# Patient Record
Sex: MALE | Race: WHITE | HISPANIC OR LATINO | Employment: FULL TIME | URBAN - METROPOLITAN AREA
[De-identification: names, ages, dates, MRNs, and addresses within clinical notes are randomized per-mention and may not be internally consistent; named-entity substitution may affect disease eponyms.]

---

## 2020-10-21 ENCOUNTER — HOSPITAL ENCOUNTER (EMERGENCY)
Facility: MEDICAL CENTER | Age: 60
End: 2020-10-21
Attending: EMERGENCY MEDICINE
Payer: COMMERCIAL

## 2020-10-21 VITALS
RESPIRATION RATE: 18 BRPM | HEART RATE: 54 BPM | BODY MASS INDEX: 33.98 KG/M2 | OXYGEN SATURATION: 94 % | DIASTOLIC BLOOD PRESSURE: 98 MMHG | WEIGHT: 224.21 LBS | SYSTOLIC BLOOD PRESSURE: 165 MMHG | HEIGHT: 68 IN | TEMPERATURE: 97.7 F

## 2020-10-21 DIAGNOSIS — S61.213A LACERATION OF LEFT MIDDLE FINGER WITHOUT FOREIGN BODY WITHOUT DAMAGE TO NAIL, INITIAL ENCOUNTER: ICD-10-CM

## 2020-10-21 PROCEDURE — 304999 HCHG REPAIR-SIMPLE/INTERMED LEVEL 1

## 2020-10-21 PROCEDURE — 99282 EMERGENCY DEPT VISIT SF MDM: CPT

## 2020-10-21 PROCEDURE — 304217 HCHG IRRIGATION SYSTEM

## 2020-10-21 PROCEDURE — 700101 HCHG RX REV CODE 250: Performed by: EMERGENCY MEDICINE

## 2020-10-21 PROCEDURE — 303747 HCHG EXTRA SUTURE

## 2020-10-21 PROCEDURE — A6403 STERILE GAUZE>16 <= 48 SQ IN: HCPCS

## 2020-10-21 RX ORDER — CEPHALEXIN 500 MG/1
500 CAPSULE ORAL 4 TIMES DAILY
Qty: 20 CAP | Refills: 0 | Status: SHIPPED | OUTPATIENT
Start: 2020-10-21 | End: 2020-10-26

## 2020-10-21 RX ORDER — LIDOCAINE HYDROCHLORIDE 10 MG/ML
20 INJECTION, SOLUTION INFILTRATION; PERINEURAL ONCE
Status: COMPLETED | OUTPATIENT
Start: 2020-10-21 | End: 2020-10-21

## 2020-10-21 RX ADMIN — LIDOCAINE HYDROCHLORIDE 20 ML: 10 INJECTION, SOLUTION INFILTRATION; PERINEURAL at 12:15

## 2020-10-21 SDOH — HEALTH STABILITY: MENTAL HEALTH: HOW OFTEN DO YOU HAVE A DRINK CONTAINING ALCOHOL?: 2-4 TIMES A MONTH

## 2020-10-21 NOTE — ED NOTES
ERP at bedside. Pt agrees with plan of care discussed by ERP. AIDET acknowledged with patient. Mayo in low position, side rail up for pt safety. Call light within reach. Will continue to monitor.

## 2020-10-21 NOTE — LETTER
"  FORM C-4:  EMPLOYEE’S CLAIM FOR COMPENSATION/ REPORT OF INITIAL TREATMENT  EMPLOYEE’S CLAIM - PROVIDE ALL INFORMATION REQUESTED   First Name Froylan Last Name Napoleon Birthdate 1960  Sex male Claim Number   Home Address [address]             [place] [address]                                   Age  60 y.o. Height  1.727 m (5' 8\") Weight  101.7 kg (224 lb 3.3 oz) [facility]  [id number]   Mailing Address [address]              Zip [address] Telephone  [phone] (home)  Primary Language Spoken   Insurer   Third Party   [employer] Employee's Occupation (Job Title) When Injury or Occupational Disease Occurred     Employer's Name  [employer] Telephone [phone]    Employer Address [address]] Zip [address]   Date of Injury  10/21/2020       Hour of Injury  10:30 AM Date Employer Notified  10/21/2020 Last Day of Work after Injury or Occupational Disease  10/21/2020 Supervisor to Whom Injury Reported  ZAC JEFF   Address or Location of Accident (if applicable) [[address]]   What were you doing at the time of accident? (if applicable) CUTTING A PIPE WITH CUTTER    How did this injury or occupational disease occur? Be specific and answer in detail. Use additional sheet if necessary)  THE BLADE ON THE CUTTER BROKE   If you believe that you have an occupational disease, when did you first have knowledge of the disability and it relationship to your employment? N/A Witnesses to the Accident  BRANDON BELEN   Nature of Injury or Occupational Disease  Workers' Compensation Part(s) of Body Injured or Affected  Hand (L), N/A, N/A    I CERTIFY THAT THE ABOVE IS TRUE AND CORRECT TO THE BEST OF MY KNOWLEDGE AND THAT I HAVE PROVIDED THIS INFORMATION IN ORDER TO OBTAIN THE BENEFITS OF [place]  I HEREBY " AUTHORIZE ANY PHYSICIAN, CHIROPRACTOR, SURGEON, PRACTITIONER, OR OTHER PERSON, ANY HOSPITAL, INCLUDING [facility] OR [facility], ANY MEDICAL SERVICE ORGANIZATION, ANY INSURANCE COMPANY, OR OTHER INSTITUTION OR ORGANIZATION TO RELEASE TO EACH OTHER, ANY MEDICAL OR OTHER INFORMATION, INCLUDING BENEFITS PAID OR PAYABLE, PERTINENT TO THIS INJURY OR DISEASE, EXCEPT INFORMATION RELATIVE TO DIAGNOSIS, TREATMENT AND/OR COUNSELING FOR AIDS, PSYCHOLOGICAL CONDITIONS, ALCOHOL OR CONTROLLED SUBSTANCES, FOR WHICH I MUST GIVE SPECIFIC AUTHORIZATION.  A PHOTOSTAT OF THIS AUTHORIZATION SHALL BE AS VALID AS THE ORIGINAL.  Date 10/21/2020                                      Place          [facility]                          Employee’s Signature   THIS REPORT MUST BE COMPLETED AND MAILED WITHIN 3 WORKING DAYS OF TREATMENT   Place [facility], EMERGENCY DEPT                       Name of Facility [facility]   Date  10/21/2020 Diagnosis  (S61.213A) Laceration of left middle finger without foreign body without damage to nail, initial encounter Is there evidence the injured employee was under the influence of alcohol and/or another controlled substance at the time of accident?   Hour  1:23 PM Description of Injury or Disease  Laceration of left middle finger without foreign body without damage to nail, initial encounter No   Treatment  Suturing, prophylactic antibiotics  Have you advised the patient to remain off work five days or more?         No   X-Ray Findings    If Yes   From Date    To Date      From information given by the employee, together with medical evidence, can you directly connect this injury or occupational disease as job incurred? Yes If No, is employee capable of: Full Duty  No Modified Duty  Yes   Is additional medical care by a physician indicated? Yes  Comments:Suture removal, reevaluation for resumption of normal activity If Modified Duty, Specify any  "Limitations / Restrictions   Avoid getting dirty or use of left third finger until cleared to resume normal activity at Worker's Comp. clinic.   Do you know of any previous injury or disease contributing to this condition or occupational disease? No    Date 10/21/2020 Print Doctor’s Name Arthur Hutson certify the employer’s copy of this form was mailed on:   Address [address] [place] [address] [phone] INSURER’S USE ONLY   Provider’s Tax ID Number [id number] Telephone Dept: [phone]    Doctor’s Signature e-ARTHUR Gil M.D. Degree  MD      Form C-4 (rev.10/07)                                                                         BRIEF DESCRIPTION OF RIGHTS AND BENEFITS  (Pursuant to [place] 616[place].050)    Notice of Injury or Occupational Disease (Incident Report Form C-1): If an injury or occupational disease (OD) arises out of and in the course of employment, you must provide written notice to your employer as soon as practicable, but no later than 7 days after the accident or OD. Your employer shall maintain a sufficient supply of the required forms.    Claim for Compensation (Form C-4): If medical treatment is sought, the form C-4 is available at the place of initial treatment. A completed \"Claim for Compensation\" (Form C-4) must be filed within 90 days after an accident or OD. The treating physician or chiropractor must, within 3 working days after treatment, complete and mail to the employer, the employer's insurer and third-party , the Claim for Compensation.    Medical Treatment: If you require medical treatment for your on-the-job injury or OD, you may be required to select a physician or chiropractor from a list provided by your workers’ compensation insurer, if it has contracted with an Organization for Managed Care (MCO) or Preferred Provider Organization (PPO) or providers of health care. If your employer has not entered into a contract with an MCO or PPO, " you may select a physician or chiropractor from the Panel of Physicians and Chiropractors. Any medical costs related to your industrial injury or OD will be paid by your insurer.    Temporary Total Disability (TTD): If your doctor has certified that you are unable to work for a period of at least 5 consecutive days, or 5 cumulative days in a 20-day period, or places restrictions on you that your employer does not accommodate, you may be entitled to TTD compensation.    Temporary Partial Disability (TPD): If the wage you receive upon reemployment is less than the compensation for TTD to which you are entitled, the insurer may be required to pay you TPD compensation to make up the difference. TPD can only be paid for a maximum of 24 months.    Permanent Partial Disability (PPD): When your medical condition is stable and there is an indication of a PPD as a result of your injury or OD, within 30 days, your insurer must arrange for an evaluation by a rating physician or chiropractor to determine the degree of your PPD. The amount of your PPD award depends on the date of injury, the results of the PPD evaluation and your age and wage.    Permanent Total Disability (PTD): If you are medically certified by a treating physician or chiropractor as permanently and totally disabled and have been granted a PTD status by your insurer, you are entitled to receive monthly benefits not to exceed 66 2/3% of your average monthly wage. The amount of your PTD payments is subject to reduction if you previously received a PPD award.    Vocational Rehabilitation Services: You may be eligible for vocational rehabilitation services if you are unable to return to the job due to a permanent physical impairment or permanent restrictions as a result of your injury or occupational disease.    Transportation and Per Damian Reimbursement: You may be eligible for travel expenses and per damina associated with medical treatment.    Reopening: You may be  able to reopen your claim if your condition worsens after claim closure.     Appeal Process: If you disagree with a written determination issued by the insurer or the insurer does not respond to your request, you may appeal to the Department of Administration, , by following the instructions contained in your determination letter. You must appeal the determination within 70 days from the date of the determination letter at [address], [address], [place], [place] [address], or [address], [address], [place], [place] [address]. If you disagree with the  decision, you may appeal to the Department of Administration, . You must file your appeal within 30 days from the date of the  decision letter at [address], [address], [place], [place] [address], or [address], [address], [place], [place] [address]. If you disagree with a decision of an , you may file a petition for judicial review with the District Court. You must do so within 30 days of the Appeal Officer’s decision. You may be represented by an  at your own expense or you may contact the [facility] for possible representation.    [place]  for Injured Workers (NAIW): If you disagree with a  decision, you may request that NAIW represent you without charge at an  Hearing. For information regarding denial of benefits, you may contact the [facility] at: [address], [address], [place] [address], [phone], or [address], [place] [address], [phone]    To File a Complaint with the Division: If you wish to file a complaint with the  of the Division of Industrial Relations (DIR),  please contact the Workers’ Compensation Section, [address], [place], [place] [address], telephone [phone], or [address], [place], [place]  [address], telephone [phone].    For assistance with Workers’ Compensation Issues: You may contact the Office of the Governor Consumer Health Assistance, [address] [place] [address], [address], [place] [address], Toll Free 1-674.112.6080, Web site: [email or web address] [facility].[email or web address]., E-mail [email or web address].  D-2 (rev. 06/18)              __________________________________________________________________                                    _________________            Employee Name / Signature                                                                                                                            Date

## 2020-10-21 NOTE — ED PROVIDER NOTES
ED Provider Note    CHIEF COMPLAINT   Chief Complaint   Patient presents with   • Hand Laceration     Left hand cut with blade at work       HPI   Froylan Bender is a 60 y.o. male who presents laceration to left third finger, dorsum.  Occurred today at work with some type of disc cutter.  Last tetanus shot 2 years ago.  Pain is mild to moderate.  No numbness.  No loss of function of the left hand.  Patient denies other injuries.    REVIEW OF SYSTEMS   Musculoskeletal: Left hand pain  Neurologic: No numbness  Skin: Left third finger laceration      PAST MEDICAL HISTORY   History reviewed. No pertinent past medical history.    FAMILY HISTORY  History reviewed. No pertinent family history.    SOCIAL HISTORY  Social History     Socioeconomic History   • Marital status: Not on file     Spouse name: Not on file   • Number of children: Not on file   • Years of education: Not on file   • Highest education level: Not on file   Occupational History   • Not on file   Social Needs   • Financial resource strain: Not on file   • Food insecurity     Worry: Not on file     Inability: Not on file   • Transportation needs     Medical: Not on file     Non-medical: Not on file   Tobacco Use   • Smoking status: Never Smoker   Substance and Sexual Activity   • Alcohol use: Yes     Frequency: 2-4 times a month   • Drug use: Never   • Sexual activity: Not on file   Lifestyle   • Physical activity     Days per week: Not on file     Minutes per session: Not on file   • Stress: Not on file   Relationships   • Social connections     Talks on phone: Not on file     Gets together: Not on file     Attends Jainism service: Not on file     Active member of club or organization: Not on file     Attends meetings of clubs or organizations: Not on file     Relationship status: Not on file   • Intimate partner violence     Fear of current or ex partner: Not on file     Emotionally abused: Not on file     Physically abused: Not on file     Forced  "sexual activity: Not on file   Other Topics Concern   • Not on file   Social History Narrative   • Not on file       SURGICAL HISTORY  History reviewed. No pertinent surgical history.    CURRENT MEDICATIONS   Home Medications    **Home medications have not yet been reviewed for this encounter**         ALLERGIES   No Known Allergies    PHYSICAL EXAM  VITAL SIGNS: BP (!) 174/99   Pulse (!) 53   Temp 36.5 °C (97.7 °F) (Temporal)   Resp 16   Ht 1.727 m (5' 8\")   Wt 101.7 kg (224 lb 3.3 oz)   SpO2 95%   BMI 34.09 kg/m²   Skin: Flap-like laceration dorsum of the left third finger over the proximal phalanx, examination wound reveals no evidence of foreign body lacerated tendon..   Vascular: Intact distal capillary refill.   Musculoskeletal: Patient has full flexion-extension all fingers.  Neurologic: Patient is intact to all fingertips left hand    RADIOLOGY/PROCEDURES  Laceration Repair Procedure Note    Indication: Laceration    Procedure: The patient was placed in the appropriate position and anesthesia around the laceration was obtained by infiltration using 1% Lidocaine without epinephrine. The area was then cleansed with betadine and draped in a sterile fashion and irrigated with high pressure normal saline. The laceration was closed with 4-0 Ethilon using interrupted sutures. There were no additional lacerations requiring repair. The wound area was then dressed with bacitracin.      Total repaired wound length: 6 cm.     Other Items: Suture count: 7    The patient tolerated the procedure well.    Complications: None          COURSE & MEDICAL DECISION MAKING  Pertinent Labs & Imaging studies reviewed. (See chart for details)  Patient had intact neurovascular exam, no evidence of extensor tendon injury.  Given the nature of the wound on the hand he is placed on antibiotic prophylaxis with Keflex.  Patient is advised to follow-up with Worker's Comp. for return to work instructions.  He is advised to have the " sutures removed in 8 to 10 days as they do extend onto the extensor surface of his PIP joint.    FINAL IMPRESSION     1. Laceration of left middle finger without foreign body without damage to nail, initial encounter               Electronically signed by: Arthur Hutson M.D., 10/21/2020 11:56 AM

## 2020-10-21 NOTE — ED NOTES
Pt given discharge instructions. RN answered questions. VSS. Pt ambulated steadily out to [facility].

## 2020-10-21 NOTE — LETTER
"  FORM C-4:  EMPLOYEE’S CLAIM FOR COMPENSATION/ REPORT OF INITIAL TREATMENT  EMPLOYEE’S CLAIM - PROVIDE ALL INFORMATION REQUESTED   First Name rFoylan Last Name Napoleon Birthdate 1960  Sex male Claim Number   Home Address [address]             [place] [address]                                   Age  60 y.o. Height  1.727 m (5' 8\") Weight  101.7 kg (224 lb 3.3 oz) [facility]     Mailing Address [address]              Zip [address] Telephone  [phone] (home)  Primary Language Spoken   Insurer   Third Party   [employer] Employee's Occupation (Job Title) When Injury or Occupational Disease Occurred     Employer's Name [employer] Telephone [phone]    Employer Address [address] [facility] [address]] Zip [address]   Date of Injury  10/21/2020       Hour of Injury  10:30 AM Date Employer Notified  10/21/2020 Last Day of Work after Injury or Occupational Disease  10/21/2020 Supervisor to Whom Injury Reported  ZAC JEFF   Address or Location of Accident (if applicable) [[address]]   What were you doing at the time of accident? (if applicable) CUTTING A PIPE WITH CUTTER    How did this injury or occupational disease occur? Be specific and answer in detail. Use additional sheet if necessary)  THE BLADE ON THE CUTTER BROKE   If you believe that you have an occupational disease, when did you first have knowledge of the disability and it relationship to your employment? N/A Witnesses to the Accident  BRANDON GLOVER   Nature of Injury or Occupational Disease  Workers' Compensation Part(s) of Body Injured or Affected  Hand (L), N/A, N/A    I CERTIFY THAT THE ABOVE IS TRUE AND CORRECT TO THE BEST OF MY KNOWLEDGE AND THAT I HAVE PROVIDED THIS INFORMATION IN ORDER TO OBTAIN THE BENEFITS OF [place]  " I HEREBY AUTHORIZE ANY PHYSICIAN, CHIROPRACTOR, SURGEON, PRACTITIONER, OR OTHER PERSON, ANY HOSPITAL, INCLUDING [facility] OR [facility], ANY MEDICAL SERVICE ORGANIZATION, ANY INSURANCE COMPANY, OR OTHER INSTITUTION OR ORGANIZATION TO RELEASE TO EACH OTHER, ANY MEDICAL OR OTHER INFORMATION, INCLUDING BENEFITS PAID OR PAYABLE, PERTINENT TO THIS INJURY OR DISEASE, EXCEPT INFORMATION RELATIVE TO DIAGNOSIS, TREATMENT AND/OR COUNSELING FOR AIDS, PSYCHOLOGICAL CONDITIONS, ALCOHOL OR CONTROLLED SUBSTANCES, FOR WHICH I MUST GIVE SPECIFIC AUTHORIZATION.  A PHOTOSTAT OF THIS AUTHORIZATION SHALL BE AS VALID AS THE ORIGINAL.  Date 10/21/2020                         Place    [facility]                                              Employee’s Signature   THIS REPORT MUST BE COMPLETED AND MAILED WITHIN 3 WORKING DAYS OF TREATMENT   Place [facility] [place], EMERGENCY DEPT                       Name of Facility [facility] [place]   Date  10/21/2020 Diagnosis  (S61.213A) Laceration of left middle finger without foreign body without damage to nail, initial encounter Is there evidence the injured employee was under the influence of alcohol and/or another controlled substance at the time of accident?   Hour  1:05 PM Description of Injury or Disease  Laceration of left middle finger without foreign body without damage to nail, initial encounter No   Treatment  Suturing, prophylactic antibiotics  Have you advised the patient to remain off work five days or more?         No   X-Ray Findings    If Yes   From Date    To Date      From information given by the employee, together with medical evidence, can you directly connect this injury or occupational disease as job incurred? Yes If No, is employee capable of: Full Duty  No Modified Duty  Yes   Is additional medical care by a physician indicated? Yes  Comments:Suture removal, reevaluation for resumption of normal activity If Modified Duty,  "Specify any Limitations / Restrictions   Avoid getting dirty or use of left third finger until cleared to resume normal activity at Worker's Comp. clinic.   Do you know of any previous injury or disease contributing to this condition or occupational disease? No    Date 10/21/2020 Print Doctor’s Name Arthur Hutson certify the employer’s copy of this form was mailed on:   Address [address] [place] [id number] [phone] INSURER’S USE ONLY   Provider’s Tax ID Number [id number] Telephone Dept: [phone]    Doctor’s Signature e-ARTHUR Gil M.D. Degree  MD      Form C-4 (rev.10/07)                                                                         BRIEF DESCRIPTION OF RIGHTS AND BENEFITS  (Pursuant to [place] 616C.050)    Notice of Injury or Occupational Disease (Incident Report Form C-1): If an injury or occupational disease (OD) arises out of and in the course of employment, you must provide written notice to your employer as soon as practicable, but no later than 7 days after the accident or OD. Your employer shall maintain a sufficient supply of the required forms.    Claim for Compensation (Form C-4): If medical treatment is sought, the form C-4 is available at the place of initial treatment. A completed \"Claim for Compensation\" (Form C-4) must be filed within 90 days after an accident or OD. The treating physician or chiropractor must, within 3 working days after treatment, complete and mail to the employer, the employer's insurer and third-party , the Claim for Compensation.    Medical Treatment: If you require medical treatment for your on-the-job injury or OD, you may be required to select a physician or chiropractor from a list provided by your workers’ compensation insurer, if it has contracted with an Organization for Managed Care (MCO) or Preferred Provider Organization (PPO) or providers of health care. If your employer has not entered into a contract with an " MCO or PPO, you may select a physician or chiropractor from the Panel of Physicians and Chiropractors. Any medical costs related to your industrial injury or OD will be paid by your insurer.    Temporary Total Disability (TTD): If your doctor has certified that you are unable to work for a period of at least 5 consecutive days, or 5 cumulative days in a 20-day period, or places restrictions on you that your employer does not accommodate, you may be entitled to TTD compensation.    Temporary Partial Disability (TPD): If the wage you receive upon reemployment is less than the compensation for TTD to which you are entitled, the insurer may be required to pay you TPD compensation to make up the difference. TPD can only be paid for a maximum of 24 months.    Permanent Partial Disability (PPD): When your medical condition is stable and there is an indication of a PPD as a result of your injury or OD, within 30 days, your insurer must arrange for an evaluation by a rating physician or chiropractor to determine the degree of your PPD. The amount of your PPD award depends on the date of injury, the results of the PPD evaluation and your age and wage.    Permanent Total Disability (PTD): If you are medically certified by a treating physician or chiropractor as permanently and totally disabled and have been granted a PTD status by your insurer, you are entitled to receive monthly benefits not to exceed 66 2/3% of your average monthly wage. The amount of your PTD payments is subject to reduction if you previously received a PPD award.    Vocational Rehabilitation Services: You may be eligible for vocational rehabilitation services if you are unable to return to the job due to a permanent physical impairment or permanent restrictions as a result of your injury or occupational disease.    Transportation and Per Damian Reimbursement: You may be eligible for travel expenses and per damian associated with medical  treatment.    Reopening: You may be able to reopen your claim if your condition worsens after claim closure.     Appeal Process: If you disagree with a written determination issued by the insurer or the insurer does not respond to your request, you may appeal to the Department of Administration, , by following the instructions contained in your determination letter. You must appeal the determination within 70 days from the date of the determination letter at [address], [address], [place], [place] [address], or [address], [place], [place] [address]. If you disagree with the  decision, you may appeal to the Department of Administration, . You must file your appeal within 30 days from the date of the  decision letter at [address], [address], [place], [place] [address], or [address], [place], [place] [address]. If you disagree with a decision of an , you may file a petition for judicial review with the District Court. You must do so within 30 days of the Appeal Officer’s decision. You may be represented by an  at your own expense or you may contact the [facility] for possible representation.    [place]  for Injured Workers (NAIW): If you disagree with a  decision, you may request that NAIW represent you without charge at an  Hearing. For information regarding denial of benefits, you may contact the [facility] at: [address], [address], [place] [address], [phone] 424-7808, or [address], [address], [place] [address], [phone]    To File a Complaint with the Division: If you wish to file a complaint with the  of the Division of Industrial Relations (DIR),  please contact the Workers’ Compensation Section, [address], [address], [place], [place] [address], telephone (406) [phone], or [address], [address], [place], [place] [address], telephone [phone].    For assistance with Workers’ Compensation Issues: You may contact the Office of the Governor Consumer Health Assistance, [address] [facility] [address] [facility] [address], [address], [place], [place] [address], Toll Free 1-462.482.7344, Web site: [email or web address].[place]., E-mail [email or web address].  D-2 (rev. 06/18)              __________________________________________________________________                                    _________________            Employee Name / Signature                                                                                                                            Date

## 2020-10-21 NOTE — DISCHARGE INSTRUCTIONS
Please have sutures removed in 8 to 10 days.  Return for any concern of infection.  Follow-up at Worker's Comp. for clearance to resume full activity at work.

## 2020-11-02 ENCOUNTER — OCCUPATIONAL MEDICINE (OUTPATIENT)
Dept: URGENT CARE | Facility: PHYSICIAN GROUP | Age: 60
End: 2020-11-02
Payer: COMMERCIAL

## 2020-11-02 VITALS
TEMPERATURE: 98.7 F | OXYGEN SATURATION: 94 % | RESPIRATION RATE: 16 BRPM | DIASTOLIC BLOOD PRESSURE: 74 MMHG | HEART RATE: 73 BPM | SYSTOLIC BLOOD PRESSURE: 142 MMHG | WEIGHT: 232.2 LBS | BODY MASS INDEX: 35.19 KG/M2 | HEIGHT: 68 IN

## 2020-11-02 DIAGNOSIS — S61.213A LACERATION OF LEFT MIDDLE FINGER WITHOUT FOREIGN BODY WITHOUT DAMAGE TO NAIL, INITIAL ENCOUNTER: ICD-10-CM

## 2020-11-02 PROCEDURE — 99214 OFFICE O/P EST MOD 30 MIN: CPT | Performed by: PHYSICIAN ASSISTANT

## 2020-11-02 ASSESSMENT — ENCOUNTER SYMPTOMS
FEVER: 0
NAUSEA: 0
VOMITING: 0
CHILLS: 0

## 2020-11-02 NOTE — LETTER
[facility]  [address] [place] [address]. [address] - [place] [address]  Phone:  [phone] - Fax:  [phone]   [facility] Progress Report and Disability Certification  Date of Service: 11/2/2020   No Show:  No  Date / Time of Next Visit: 11/09/2020@6:00PM   Claim Information   Patient Name: Froylan Rodney  Claim Number:     Employer:   [employer] Date of Injury: 10/21/2020     Insurer / TPA: Pal  ID / SSN:     Occupation:   Diagnosis: The encounter diagnosis was Laceration of left middle finger without foreign body without damage to nail, initial encounter.    Medical Information   Related to Industrial Injury? Yes    Subjective Complaints:  Date of Injury:  10/21/2020.  Patient presents for evaluation of left third finger laceration.  Finger cut on disc cutter.  He was initially evaluated in the emergency department for this on the day of the injury.  He had 7 sutures placed and was started on prophylactic antibiotics.  Wound is clean and dry.  No erythema, edema, discharge.  Denies numbness, tingling, nausea, vomiting, fevers, chills.  No other aggravating or alleviating factors. Tdap UTD   Objective Findings: Left hand: 4 cm irregular laceration over dorsum of proximal, left third digit.  7 single interrupted sutures in place.  Wound is well approximated and healing well.  Scant dried blood.  No surrounding erythema or edema.  Purulent discharge or dehiscence.  Range of motion within normal limits.  Distal sensation intact and comparable to unaffected digits.  Cap refill less than 2 seconds.    Pre-Existing Condition(s):     Assessment:   Condition Improved    Status: Additional Care Required  Permanent Disability:No    Plan:      Diagnostics:      Comments:  Sutures removed and Steri-Strips placed along proximal portion of the wound.  Wound care instructions reviewed.  We will keep patient on light duty.  Follow-up in 1 week.       Disability Information   Status: Released to Restricted Duty    From:  11/2/2020  Through: 11/9/2020 Restrictions are:     Physical Restrictions   Sitting:    Standing:    Stooping:    Bending:      Squatting:    Walking:    Climbing:    Pushing:      Pulling:    Other:    Reaching Above Shoulder (L):   Reaching Above Shoulder (R):       Reaching Below Shoulder (L):    Reaching Below Shoulder (R):      Not to exceed Weight Limits   Carrying(hrs):   Weight Limit(lb): < or = to 10 pounds Lifting(hrs):   Weight  Limit(lb): < or = to 10 pounds   Comments:      Repetitive Actions   Hands: i.e. Fine Manipulations from Grasping: < or = to 2 hrs/day  Comments:Left hand only.   Feet: i.e. Operating Foot Controls:     Driving / Operate Machinery:     Provider Name:   Heath Nagy P.A.-C. Physician Signature:  Physician Name:     Clinic Name / Location: [address]  [place] [address] Clinic Phone Number: Dept: [phone]   Appointment Time: 6:05 Pm Visit Start Time: 6:13 PM   Check-In Time:  6:07 Pm Visit Discharge Time:  7:05PM   Original-Treating Physician or Chiropractor    Page 2-Insurer/TPA    Page 3-Employer    Page 4-Employee

## 2020-11-03 NOTE — PROGRESS NOTES
"Subjective:   Frolyan Rodney is a 60 y.o. male who presents for Work-Related Injury (WC FV L hand lac, pt states that he feels a lot better. )       Date of Injury:  10/21/2020.  Patient presents for evaluation of left third finger laceration.  Finger cut on disc cutter.  He was initially evaluated in the emergency department for this on the day of the injury.  He had 7 sutures placed and was started on prophylactic antibiotics.  Wound is clean and dry.  No erythema, edema, discharge.  Denies numbness, tingling, nausea, vomiting, fevers, chills.  No other aggravating or alleviating factors. Tdap UTD.        Laceration   The incident occurred more than 1 week ago. The laceration is located on the left hand. The laceration is 4 cm in size. The laceration mechanism was a metal edge. The patient is experiencing no pain. He reports no foreign bodies present. His tetanus status is UTD.     Review of Systems   Constitutional: Negative for chills and fever.   Gastrointestinal: Negative for nausea and vomiting.       PMH: history reviewed. No pertinent hx.  MEDS: keflex  ALLERGIES: No Known Allergies  SURGHX: History reviewed. No pertinent surgical history.  SOCHX:  reports that he has never smoked. He has never used smokeless tobacco. He reports current alcohol use. He reports that he does not use drugs.  FH: Family history was reviewed, no pertinent findings to report   Objective:   /74 (BP Location: Right arm, Patient Position: Sitting, BP Cuff Size: Adult)   Pulse 73   Temp 37.1 °C (98.7 °F) (Temporal)   Resp 16   Ht 1.72 m (5' 7.72\")   Wt 105.3 kg (232 lb 3.2 oz)   SpO2 94%   BMI 35.60 kg/m²   Physical Exam  Vitals signs reviewed.   Constitutional:       General: He is not in acute distress.     Appearance: Normal appearance. He is well-developed. He is not toxic-appearing.   HENT:      Head: Normocephalic and atraumatic.      Right Ear: External ear normal.      Left Ear: External ear normal.      " Nose: Nose normal.   Eyes:      General: Gaze aligned appropriately.   Neck:      Musculoskeletal: Neck supple.   Cardiovascular:      Rate and Rhythm: Normal rate and regular rhythm.   Pulmonary:      Effort: Pulmonary effort is normal. No respiratory distress.      Breath sounds: No stridor.   Musculoskeletal:      Comments: Left hand: 4 cm irregular laceration over dorsum of proximal, left third digit.  7 single interrupted sutures in place.  Wound is well approximated and healing well.  Scant dried blood.  No surrounding erythema or edema.  Purulent discharge or dehiscence.  Range of motion within normal limits.  Distal sensation intact and comparable to unaffected digits.  Cap refill less than 2 seconds.   Skin:     General: Skin is warm and dry.      Capillary Refill: Capillary refill takes less than 2 seconds.   Neurological:      Mental Status: He is alert and oriented to person, place, and time.      Comments: CN2-12 grossly intact   Psychiatric:         Speech: Speech normal.         Behavior: Behavior normal.          Assessment/Plan:   1. Laceration of left middle finger without foreign body without damage to nail, initial encounter    Records from 10/21/2020 reviewed.  Patient evaluated in ED.    7 x simple interrupted sutures removed.  Patient tolerated this well.  2 Steri-Strips placed at proximal wound as this tissue has not completely healed and it is under higher tension.  Wound care instructions reviewed.  Monitor for infection.  Continue light duty.  Follow-up in 7 days for reevaluation.    Differential diagnosis, natural history, supportive care, and indications for immediate follow-up discussed.

## 2020-11-09 ENCOUNTER — OCCUPATIONAL MEDICINE (OUTPATIENT)
Dept: URGENT CARE | Facility: PHYSICIAN GROUP | Age: 60
End: 2020-11-09
Payer: COMMERCIAL

## 2020-11-09 VITALS
HEART RATE: 63 BPM | DIASTOLIC BLOOD PRESSURE: 82 MMHG | OXYGEN SATURATION: 96 % | TEMPERATURE: 98 F | RESPIRATION RATE: 16 BRPM | BODY MASS INDEX: 35.04 KG/M2 | WEIGHT: 231.2 LBS | HEIGHT: 68 IN | SYSTOLIC BLOOD PRESSURE: 150 MMHG

## 2020-11-09 DIAGNOSIS — Z51.89 VISIT FOR WOUND CHECK: ICD-10-CM

## 2020-11-09 DIAGNOSIS — S61.213D LACERATION OF LEFT MIDDLE FINGER WITHOUT FOREIGN BODY WITHOUT DAMAGE TO NAIL, SUBSEQUENT ENCOUNTER: ICD-10-CM

## 2020-11-09 PROCEDURE — 99203 OFFICE O/P NEW LOW 30 MIN: CPT | Performed by: NURSE PRACTITIONER

## 2020-11-09 NOTE — LETTER
[facility]  [address] - [place] [address]  Phone:  [phone] - Fax:  [phone]   [facility] Progress Report and Disability Certification  Date of Service: 11/9/2020   No Show:  No  Date / Time of Next Visit:    Claim Information   Patient Name: Froylan Rodney  Claim Number:     Employer:   [employer] Date of Injury: 10/21/2020     Insurer / TPA: Pal  ID / SSN:     Occupation:   Diagnosis: Diagnoses of Laceration of left middle finger without foreign body without damage to nail, subsequent encounter and Visit for wound check were pertinent to this visit.    Medical Information   Related to Industrial Injury? Yes    Subjective Complaints:  DOI: 10/21/2020. Follow up on finger laceration, left 3rd finger. Has improvement. No numbness or weakness. No redness, heat, drainage.  Has full ROM. No pain. Denies complications.         Objective Findings: Musculoskeletal:      Left hand: He exhibits normal range of motion, no tenderness, no bony tenderness, normal capillary refill and no swelling. Normal sensation noted. Normal strength noted.      Comments: Mild intermittent scabbing along area of 4 cm newer tissue growth to dorsal proximal phalanx of left 3rd digit. No erythema or drainage. No tenderness to palpation. Full ROM. Good finger strength and hand , 5/5. Distal neurovascular intact.         Pre-Existing Condition(s): None known.   Assessment:   Condition Improved    Status: Discharged /  MMI  Permanent Disability:No    Plan:      Diagnostics:      Comments:       Disability Information   Status: Released to Full Duty    From:  11/9/2020  Through: 11/9/2020 Restrictions are:     Physical Restrictions   Sitting:    Standing:    Stooping:    Bending:      Squatting:    Walking:    Climbing:    Pushing:      Pulling:    Other:    Reaching Above Shoulder (L):   Reaching Above Shoulder (R):       Reaching Below  Shoulder (L):    Reaching Below Shoulder (R):      Not to exceed Weight Limits   Carrying(hrs):   Weight Limit(lb):   Lifting(hrs):   Weight  Limit(lb):     Comments:      Repetitive Actions   Hands: i.e. Fine Manipulations from Grasping:     Feet: i.e. Operating Foot Controls:     Driving / Operate Machinery:     Provider Name:   ANDREI White Physician Signature:  Physician Name:     Clinic Name / Location: [address]  [place] [address] Clinic Phone Number: Dept: [phone]   Appointment Time: 6:00 Pm Visit Start Time: 6:40 PM   Check-In Time:  6:00 Pm Visit Discharge Time:  7:23PM   Original-Treating Physician or Chiropractor    Page 2-Insurer/TPA    Page 3-Employer    Page 4-Employee

## 2020-11-09 NOTE — LETTER
[facility] [place] [facility]  [address] - [place] [address]  Phone:  [phone] - Fax:  [phone]   [facility] Progress Report and Disability Certification  Date of Service: 11/9/2020   No Show:  No  Date / Time of Next Visit: 11/9/2020   Claim Information   Patient Name: Froylan Rodney  Claim Number:     Employer:   *** Date of Injury: 10/21/2020     Insurer / TPA: Pal *** ID / SSN:     Occupation:  *** Diagnosis: There were no encounter diagnoses.    Medical Information   Related to Industrial Injury? Yes ***   Subjective Complaints:  DOI: 10/21/2020. Follow up on finger laceration, left 3rd finger. Has improvement. No numbness or weakness. No redness, heat, drainage.  Has full ROM. No pain. Denies complications.         Objective Findings: Physical Exam  Musculoskeletal:      Left hand: He exhibits normal range of motion, no tenderness, no bony tenderness, normal capillary refill and no swelling. Normal sensation noted. Normal strength noted.      Comments: Mild intermittent scabbing along area of 4 cm newer tissue growth to dorsal proximal phalanx of left 3rd digit. No erythema or drainage. No tenderness to palpation. Full ROM. Good finger strength and hand , 5/5. Distal neurovascular intact.         Pre-Existing Condition(s): None known.   Assessment:   Condition Improved    Status: Discharged /  MMI  Permanent Disability:No    Plan:      Diagnostics:      Comments:       Disability Information   Status: Released to Full Duty    From:  11/9/2020  Through: 11/9/2020 Restrictions are:     Physical Restrictions   Sitting:    Standing:    Stooping:    Bending:      Squatting:    Walking:    Climbing:    Pushing:      Pulling:    Other:    Reaching Above Shoulder (L):   Reaching Above Shoulder (R):       Reaching Below Shoulder (L):    Reaching Below Shoulder (R):      Not to exceed Weight Limits   Carrying(hrs):   Weight Limit(lb):    Lifting(hrs):   Weight  Limit(lb):     Comments:      Repetitive Actions   Hands: i.e. Fine Manipulations from Grasping:     Feet: i.e. Operating Foot Controls:     Driving / Operate Machinery:     Provider Name:   ANDREI White Physician Signature:  Physician Name:     Clinic Name / Location: [address]  [place] [address] Clinic Phone Number: Dept: [phone]   Appointment Time: 6:00 Pm Visit Start Time: 6:40 PM   Check-In Time:  6:00 Pm Visit Discharge Time:  ***   Original-Treating Physician or Chiropractor    Page 2-Insurer/TPA    Page 3-Employer    Page 4-Employee

## 2020-11-10 NOTE — PATIENT INSTRUCTIONS
Cuidado de un desgarro, en adultos  Laceration Care, Adult  Un desgarro es un renny que puede atravesar todas las capas de la piel hasta el tejido que se encuentra debajo de la piel. Algunos desgarros cicatrizan por sí solos. Otros se deben cerrar con puntos (suturas), grapas, tiras adhesivas para la piel o goma para cerrar la piel. El cuidado adecuado de un desgarro reduce el riesgo de infección, ayuda a que el desgarro cierre mejor, y puede prevenir la formación de cicatrices.  Cómo cuidar del desgarro  Lávese las tamiko con agua y jabón antes de tocarse la herida y cambiar la venda (vendaje). Use desinfectante para tamiko si no dispone de agua y jabón.  Mantenga la herida limpia y seca.  Si le colocaron un vendaje, cámbielo al menos justina vez al día o jerry se lo haya indicado el médico. También debe cambiarlo si se moja o se ensucia.  Si se utilizaron suturas o grapas:  · Mantenga la herida completamente seca alfredito las primeras 24 horas o jerry se lo haya indicado el médico. Transcurrido ricky tiempo, puede ducharse o be halina de inmersión. No obstante, asegúrese de no sumergir la herida en agua hasta que le hayan quitado las suturas o las grapas.  · Limpie la herida justina vez por día o jerry se lo haya indicado el médico:  ? Lave la herida con agua y jabón.  ? Enjuáguela con agua para quitar todo el jabón.  ? Séquela dando palmaditas con justina toalla limpia. No frote la herida.  · Después de limpiar la herida, aplique justina delgada capa de ungüento con antibiótico jerry se lo haya indicado el médico. [place] ayudará a prevenir infecciones y a evitar que el vendaje se adhiera a la herida.  · [place]cy que le retiren las suturas o las grapas jerry se lo haya indicado el médico.  Si se utilizaron tiras adhesivas para la piel:  · No deje que las tiras adhesivas para la piel se mojen. Puede bañarse o ducharse, thomas tenga cuidado de no mojar la herida.  · Si se moja, séquela dando palmaditas con justina toalla limpia. No frote la  herida.  · Las tiras adhesivas para la piel se caen solas. Puede recortar las tiras a medida que la herida se viry. No quite las tiras adhesivas para la piel que aún estén pegadas a la herida. Estas se caerán cuando sea el momento.  Si se utilizó goma para cerrar la piel:  · Trate de mantener la herida seca; sin embargo, puede mojarla ligeramente cuando se bañe o se duche. No sumerja la herida en el agua, por ejemplo, al nadar.  · Después de ducharse o bañarse, seque la herida con cuidado dando palmaditas con justina toalla limpia. No frote la herida.  · No practique actividades que lo joey transpirar mucho hasta que la goma para cerrar la piel se haya caído shanda.  · No aplique líquidos, cremas ni ungüentos medicinales en la herida mientras todavía tenga la goma para cerrar la piel. De lo contrario, puede hacer que la goma se despegue antes de que la herida cicatrice.  · Si la herida está cubierta con un vendaje, tenga cuidado de no aplicar cinta adhesiva directamente sobre la goma para cerrar la piel. De lo contrario, puede hacer que la goma se despegue antes de que la herida cicatrice.  · No toque la goma. La goma para cerrar la piel generalmente permanece sobre la piel de 5 a 10 días y luego se  shanda.  Indicaciones generales    · [place] los medicamentos de venta kellen y los recetados solamente jerry se lo haya indicado el médico.  · Si le recetaron un medicamento o ungüento con antibiótico, tómelo o aplíqueselo jerry se lo haya indicado el médico. No deje de usarlo aunque la afección mejore.  · No se rasque ni se toque la herida.  · Controle la herida todos los días para detectar signos de infección. Esté atento a lo siguiente:  ? Dolor, hinchazón o enrojecimiento.  ? Líquido, alden o pus.  · [place] las primeras 24 a 48 horas después de que le hayan reparado el desgarro, cuando esté sentado o acostado, levante (eleve) la raven de la lesión por encima del nivel del corazón.  · Si se lo indican, aplique hielo sobre la  raven afectada:  ? Ponga el hielo en justina bolsa plástica.  ? Coloque justina toalla entre la piel y la bolsa de hielo.  ? Coloque el hielo alfredito 20 minutos, 2 a 3 veces por día.  · Concurra a todas las visitas de seguimiento jerry se lo haya indicado el médico. [place] es importante.  Comuníquese con un médico si:  · Le colocaron la vacuna antitetánica y tiene hinchazón, dolor intenso, enrojecimiento o hemorragia en el sitio de la inyección.  · Tiene fiebre.  · Justina herida que estaba cerrada y se abre.  · Percibe que sale mal olor de la herida o del vendaje.  · Nota un cuerpo extraño en la herida, jerry un trozo de amezcua o dave.  · El dolor no se agustín con los medicamentos.  · Presenta un aumento del enrojecimiento, la hinchazón o el dolor en el lugar de la herida.  · Presenta líquido, alden o pus que provienen de la herida.  · Debe cambiar el vendaje con frecuencia debido al drenaje de líquido, alden o pus proveniente de la herida.  · Presenta justina nueva erupción cutánea.  · Presenta adormecimiento alrededor de la herida.  Solicite ayuda de inmediato si:  · Tiene mucha hinchazón alrededor de la herida.  · El dolor aumenta repentinamente y es intenso.  · Presenta nódulos dolorosos cerca de la herida o en la piel en cualquier raven del cuerpo.  · Tiene justina línea olivier que sale de la herida.  · La herida está en la mano o en el pie y no puede  correctamente neris de los dedos.  · La herida está en la mano o en el pie y observa que los dedos tienen un mesha pálido o azulado.  Resumen  · Un desgarro es un renny que puede atravesar todas las capas de la piel hasta el tejido que se encuentra debajo de la piel.  · Algunos desgarros cicatrizan por sí solos. Otros se deben cerrar con puntos (suturas), grapas, tiras adhesivas para la piel o goma para cerrar la piel.  · El cuidado adecuado de un desgarro reduce el riesgo de infección, ayuda a que el desgarro cierre mejor, y previene la formación de cicatrices.  Esta información no  tiene jerry fin reemplazar el consejo del médico. Asegúrese de hacerle al médico cualquier pregunta que tenga.  Document Released: 12/18/2006 Document Revised: 02/21/2019 Document Reviewed: 02/21/2019  Elsevier Patient Education © 2020 [employer]

## 2020-11-10 NOTE — PROGRESS NOTES
Subjective:     Froylan Rodney is a 60 y.o. male who presents for Work-Related Injury (L hand middle finger lac, pt states that he feels good. x15 days healing )          Left hand: 4 cm irregular laceration over dorsum of proximal, left third digit.  7 single interrupted sutures in place.  Wound is well approximated and healing well.  Scant dried blood.  No surrounding erythema or edema.  Purulent discharge or dehiscence.  Range of motion within normal limits.  Distal sensation intact and comparable to unaffected digits.  Cap refill less than 2 seconds.  DOI: 10/21/2020. Follow up on finger laceration, left 3rd finger. Has improvement. No numbness or weakness. No redness, heat, drainage.  Has full ROM. Denies complications.      Hand Injury  This is a new problem. The current episode started 1 to 4 weeks ago. The problem has been rapidly improving. Pertinent negatives include no fever, joint swelling, numbness or weakness. Nothing aggravates the symptoms. The treatment provided significant relief.       History reviewed. No pertinent past medical history.    History reviewed. No pertinent surgical history.    Social History     Socioeconomic History   • Marital status:      Spouse name: Not on file   • Number of children: Not on file   • Years of education: Not on file   • Highest education level: Not on file   Occupational History   • Not on file   Social Needs   • Financial resource strain: Not on file   • Food insecurity     Worry: Not on file     Inability: Not on file   • Transportation needs     Medical: Not on file     Non-medical: Not on file   Tobacco Use   • Smoking status: Never Smoker   • Smokeless tobacco: Never Used   Substance and Sexual Activity   • Alcohol use: Yes     Frequency: 2-4 times a month   • Drug use: Never   • Sexual activity: Not on file   Lifestyle   • Physical activity     Days per week: Not on file     Minutes per session: Not on file   • Stress: Not on file  "  Relationships   • Social connections     Talks on phone: Not on file     Gets together: Not on file     Attends [employer] service: Not on file     Active member of club or organization: Not on file     Attends meetings of clubs or organizations: Not on file     Relationship status: Not on file   • Intimate partner violence     Fear of current or ex partner: Not on file     Emotionally abused: Not on file     Physically abused: Not on file     Forced sexual activity: Not on file   Other Topics Concern   • Not on file   Social History Narrative   • Not on file        History reviewed. No pertinent family history.     No Known Allergies    Review of Systems   Constitutional: Negative for fever.   Musculoskeletal: Negative for joint pain and joint swelling.   Neurological: Negative for sensory change, weakness and numbness.   All other systems reviewed and are negative.       Objective:   /82 (BP Location: Right arm, Patient Position: Sitting, BP Cuff Size: Adult)   Pulse 63   Temp 36.7 °C (98 °F) (Temporal)   Resp 16   Ht 1.72 m (5' 7.72\")   Wt 104.9 kg (231 lb 3.2 oz)   SpO2 96%   BMI 35.45 kg/m²     Physical Exam  Vitals signs reviewed.   Constitutional:       General: He is not in acute distress.     Appearance: He is well-developed.   HENT:      Head: Normocephalic and atraumatic.      Right Ear: External ear normal.      Left Ear: External ear normal.      Nose: Nose normal.   Eyes:      Conjunctiva/sclera: Conjunctivae normal.   Neck:      Musculoskeletal: Normal range of motion.   Cardiovascular:      Rate and Rhythm: Normal rate.   Pulmonary:      Effort: Pulmonary effort is normal.   Musculoskeletal: Normal range of motion.         General: No tenderness.      Left hand: He exhibits normal range of motion, no tenderness, no bony tenderness, normal capillary refill and no swelling. Normal sensation noted. Normal strength noted.      Comments: Mild intermittent scabbing along area of 4 cm newer " tissue growth to dorsal proximal phalanx of left 3rd digit. No erythema or drainage. No tenderness to palpation. Full ROM. Good finger strength and hand , 5/5. Distal neurovascular intact.    Skin:     General: Skin is warm and dry.      Findings: No rash.   Neurological:      General: No focal deficit present.      Mental Status: He is alert and oriented to person, place, and time.      GCS: GCS eye subscore is 4. GCS verbal subscore is 5. GCS motor subscore is 6.   Psychiatric:         Mood and Affect: Mood normal.         Speech: Speech normal.         Behavior: Behavior normal.         Thought Content: Thought content normal.         Judgment: Judgment normal.         Assessment/Plan:   1. Laceration of left middle finger without foreign body without damage to nail, subsequent encounter    2. Visit for wound check  -MMI, Note [id number].    Follow up for signs of infection (redness, red streaking, pus, foul odor, severe pain, increased swelling or fever) or any other concerns. Follow up emergently for severe uncontrolled pain, neurovascular compromise (decreased sensation, motion, or circulation).    Differential diagnosis, natural history, supportive care, and indications for immediate follow-up discussed.

## 2020-11-12 ASSESSMENT — ENCOUNTER SYMPTOMS
JOINT SWELLING: 0
NUMBNESS: 0
FEVER: 0
WEAKNESS: 0
SENSORY CHANGE: 0